# Patient Record
Sex: MALE | Race: WHITE | NOT HISPANIC OR LATINO
[De-identification: names, ages, dates, MRNs, and addresses within clinical notes are randomized per-mention and may not be internally consistent; named-entity substitution may affect disease eponyms.]

---

## 2023-06-09 ENCOUNTER — NON-APPOINTMENT (OUTPATIENT)
Age: 53
End: 2023-06-09

## 2023-06-09 ENCOUNTER — APPOINTMENT (OUTPATIENT)
Dept: FAMILY MEDICINE | Facility: CLINIC | Age: 53
End: 2023-06-09
Payer: MEDICAID

## 2023-06-09 VITALS
OXYGEN SATURATION: 98 % | DIASTOLIC BLOOD PRESSURE: 77 MMHG | HEIGHT: 67 IN | WEIGHT: 161 LBS | SYSTOLIC BLOOD PRESSURE: 125 MMHG | HEART RATE: 64 BPM | BODY MASS INDEX: 25.27 KG/M2 | TEMPERATURE: 97.6 F

## 2023-06-09 DIAGNOSIS — Z00.00 ENCOUNTER FOR GENERAL ADULT MEDICAL EXAMINATION W/OUT ABNORMAL FINDINGS: ICD-10-CM

## 2023-06-09 DIAGNOSIS — Z78.9 OTHER SPECIFIED HEALTH STATUS: ICD-10-CM

## 2023-06-09 PROCEDURE — 93000 ELECTROCARDIOGRAM COMPLETE: CPT

## 2023-06-09 PROCEDURE — 99386 PREV VISIT NEW AGE 40-64: CPT | Mod: 25

## 2023-06-09 NOTE — PHYSICAL EXAM
[Normal] : no acute distress, well nourished, well developed and well-appearing [PERRL] : pupils equal round and reactive to light [Thyroid Normal, No Nodules] : the thyroid was normal and there were no nodules present [No Respiratory Distress] : no respiratory distress  [No Accessory Muscle Use] : no accessory muscle use [Clear to Auscultation] : lungs were clear to auscultation bilaterally [No Edema] : there was no peripheral edema [Soft] : abdomen soft [Non Tender] : non-tender [Normal Bowel Sounds] : normal bowel sounds [No Rash] : no rash [Coordination Grossly Intact] : coordination grossly intact [Normal Insight/Judgement] : insight and judgment were intact [de-identified] : sinus poppy

## 2023-06-09 NOTE — HISTORY OF PRESENT ILLNESS
[FreeTextEntry1] : no complaints, new patient visit [de-identified] : 53 yo M no significant PMHx, has not been to any provider since  here today to establish care as new patient.\par No PMHx\par PSHx: ACL repair\par Used to be 115 lb heavier but lost weight years ago since he started crossfit\par \par Family Hx:\par Mom: HTN\par Maternal grandmother: DM 2\par Maternal uncle  from prostate CA, 70's\par Various cancers on both sides, not sure what specifically

## 2023-06-09 NOTE — HEALTH RISK ASSESSMENT
[Good] : ~his/her~  mood as  good [Yes] : Yes [4 or more  times a week (4 pts)] : 4 or more  times a week (4 points) [3 or 4 (1 pt)] : 3 or 4  (1 point) [Monthly (2 pts)] : Monthly (2 points) [No falls in past year] : Patient reported no falls in the past year [0] : 2) Feeling down, depressed, or hopeless: Not at all (0) [PHQ-2 Negative - No further assessment needed] : PHQ-2 Negative - No further assessment needed [None] : None [With Significant Other] : lives with significant other [Employed] : employed [High School] : high school [Single] : single [Sexually Active] : sexually active [Feels Safe at Home] : Feels safe at home [Fully functional (bathing, dressing, toileting, transferring, walking, feeding)] : Fully functional (bathing, dressing, toileting, transferring, walking, feeding) [Fully functional (using the telephone, shopping, preparing meals, housekeeping, doing laundry, using] : Fully functional and needs no help or supervision to perform IADLs (using the telephone, shopping, preparing meals, housekeeping, doing laundry, using transportation, managing medications and managing finances) [Smoke Detector] : smoke detector [Carbon Monoxide Detector] : carbon monoxide detector [Seat Belt] :  uses seat belt [Patient/Caregiver not ready to engage] : , patient/caregiver not ready to engage [Never] : Never [Audit-CScore] : 7 [de-identified] : crossfit  [[id number]] : 0 [Change in mental status noted] : No change in mental status noted [Language] : denies difficulty with language [Behavior] : denies difficulty with behavior [Learning/Retaining New Information] : denies difficulty learning/retaining new information [Handling Complex Tasks] : denies difficulty handling complex tasks [Reasoning] : denies difficulty with reasoning [Spatial Ability and Orientation] : denies difficulty with spatial ability and orientation [Reports changes in hearing] : Reports no changes in hearing [Reports changes in vision] : Reports no changes in vision [Reports changes in dental health] : Reports no changes in dental health [Sunscreen] : does not use sunscreen [ColonoscopyComments] : never; will give a referral  [FreeTextEntry2] : \par   [de-identified] : uses glasses for distance and reading [de-identified] : will see dentist

## 2023-06-10 LAB
APPEARANCE: CLEAR
BILIRUBIN URINE: NEGATIVE
BLOOD URINE: NEGATIVE
COLOR: YELLOW
GLUCOSE QUALITATIVE U: NEGATIVE MG/DL
KETONES URINE: NEGATIVE MG/DL
LEUKOCYTE ESTERASE URINE: NEGATIVE
NITRITE URINE: NEGATIVE
PH URINE: 6
PROTEIN URINE: NEGATIVE MG/DL
PSA SERPL-MCNC: 0.62 NG/ML
SPECIFIC GRAVITY URINE: 1.01
UROBILINOGEN URINE: 0.2 MG/DL

## 2023-06-11 ENCOUNTER — NON-APPOINTMENT (OUTPATIENT)
Age: 53
End: 2023-06-11

## 2023-06-12 LAB
25(OH)D3 SERPL-MCNC: 54.1 NG/ML
ALBUMIN SERPL ELPH-MCNC: 5 G/DL
ALP BLD-CCNC: 53 U/L
ALT SERPL-CCNC: 20 U/L
ANION GAP SERPL CALC-SCNC: 12 MMOL/L
AST SERPL-CCNC: 21 U/L
BILIRUB SERPL-MCNC: 1.1 MG/DL
BUN SERPL-MCNC: 9 MG/DL
CALCIUM SERPL-MCNC: 10.7 MG/DL
CHLORIDE SERPL-SCNC: 103 MMOL/L
CHOLEST SERPL-MCNC: 177 MG/DL
CO2 SERPL-SCNC: 28 MMOL/L
CREAT SERPL-MCNC: 0.9 MG/DL
EGFR: 103 ML/MIN/1.73M2
ESTIMATED AVERAGE GLUCOSE: 103 MG/DL
FOLATE SERPL-MCNC: 12.3 NG/ML
GLUCOSE SERPL-MCNC: 89 MG/DL
HBA1C MFR BLD HPLC: 5.2 %
HCV RNA FLD QL NAA+PROBE: NORMAL
HCV RNA SPEC QL PROBE+SIG AMP: NOT DETECTED
HDLC SERPL-MCNC: 85 MG/DL
HIV1+2 AB SPEC QL IA.RAPID: NONREACTIVE
LDLC SERPL CALC-MCNC: 82 MG/DL
MAGNESIUM SERPL-MCNC: 1.8 MG/DL
NONHDLC SERPL-MCNC: 92 MG/DL
POTASSIUM SERPL-SCNC: 4.2 MMOL/L
PROT SERPL-MCNC: 7.7 G/DL
SODIUM SERPL-SCNC: 144 MMOL/L
TRIGL SERPL-MCNC: 52 MG/DL
TSH SERPL-ACNC: 1.4 UIU/ML
VIT B12 SERPL-MCNC: 959 PG/ML

## 2023-06-27 ENCOUNTER — NON-APPOINTMENT (OUTPATIENT)
Age: 53
End: 2023-06-27

## 2023-07-26 ENCOUNTER — APPOINTMENT (OUTPATIENT)
Dept: INTERNAL MEDICINE | Facility: CLINIC | Age: 53
End: 2023-07-26
Payer: MEDICAID

## 2023-07-26 VITALS
HEART RATE: 53 BPM | OXYGEN SATURATION: 98 % | BODY MASS INDEX: 25.43 KG/M2 | HEIGHT: 67 IN | DIASTOLIC BLOOD PRESSURE: 70 MMHG | WEIGHT: 162 LBS | SYSTOLIC BLOOD PRESSURE: 124 MMHG

## 2023-07-26 DIAGNOSIS — Z12.11 ENCOUNTER FOR SCREENING FOR MALIGNANT NEOPLASM OF COLON: ICD-10-CM

## 2023-07-26 PROCEDURE — 99203 OFFICE O/P NEW LOW 30 MIN: CPT

## 2023-08-16 ENCOUNTER — APPOINTMENT (OUTPATIENT)
Dept: CARDIOLOGY | Facility: CLINIC | Age: 53
End: 2023-08-16
Payer: MEDICAID

## 2023-08-16 ENCOUNTER — NON-APPOINTMENT (OUTPATIENT)
Age: 53
End: 2023-08-16

## 2023-08-16 VITALS — OXYGEN SATURATION: 100 % | SYSTOLIC BLOOD PRESSURE: 135 MMHG | DIASTOLIC BLOOD PRESSURE: 84 MMHG | HEART RATE: 56 BPM

## 2023-08-16 DIAGNOSIS — Z78.9 OTHER SPECIFIED HEALTH STATUS: ICD-10-CM

## 2023-08-16 PROCEDURE — 99203 OFFICE O/P NEW LOW 30 MIN: CPT | Mod: 25

## 2023-08-16 PROCEDURE — 93000 ELECTROCARDIOGRAM COMPLETE: CPT

## 2023-10-01 PROBLEM — Z78.9 CAFFEINE USE: Status: ACTIVE | Noted: 2023-08-16

## 2023-11-03 ENCOUNTER — APPOINTMENT (OUTPATIENT)
Dept: INTERNAL MEDICINE | Facility: AMBULATORY MEDICAL SERVICES | Age: 53
End: 2023-11-03
Payer: MEDICAID

## 2023-11-03 PROCEDURE — 45382 COLONOSCOPY W/CONTROL BLEED: CPT | Mod: PT

## 2023-11-22 ENCOUNTER — APPOINTMENT (OUTPATIENT)
Dept: SURGERY | Facility: CLINIC | Age: 53
End: 2023-11-22
Payer: MEDICAID

## 2023-11-22 VITALS
OXYGEN SATURATION: 98 % | BODY MASS INDEX: 25.11 KG/M2 | WEIGHT: 160 LBS | SYSTOLIC BLOOD PRESSURE: 173 MMHG | RESPIRATION RATE: 17 BRPM | HEART RATE: 61 BPM | TEMPERATURE: 97.3 F | HEIGHT: 67 IN | DIASTOLIC BLOOD PRESSURE: 100 MMHG

## 2023-11-22 DIAGNOSIS — Z78.9 OTHER SPECIFIED HEALTH STATUS: ICD-10-CM

## 2023-11-22 PROCEDURE — 45330 DIAGNOSTIC SIGMOIDOSCOPY: CPT

## 2023-11-22 PROCEDURE — 99204 OFFICE O/P NEW MOD 45 MIN: CPT | Mod: 25

## 2023-11-22 RX ORDER — SODIUM SULFATE, POTASSIUM SULFATE AND MAGNESIUM SULFATE 1.6; 3.13; 17.5 G/177ML; G/177ML; G/177ML
17.5-3.13-1.6 SOLUTION ORAL
Qty: 2 | Refills: 0 | Status: DISCONTINUED | COMMUNITY
Start: 2023-10-31 | End: 2023-11-22

## 2023-11-24 ENCOUNTER — RESULT REVIEW (OUTPATIENT)
Age: 53
End: 2023-11-24

## 2023-11-27 ENCOUNTER — RESULT REVIEW (OUTPATIENT)
Age: 53
End: 2023-11-27

## 2023-11-27 LAB
ALBUMIN SERPL ELPH-MCNC: 5.1 G/DL
ALP BLD-CCNC: 68 U/L
ALT SERPL-CCNC: 16 U/L
ANION GAP SERPL CALC-SCNC: 10 MMOL/L
AST SERPL-CCNC: 23 U/L
BASOPHILS # BLD AUTO: 0.07 K/UL
BASOPHILS NFR BLD AUTO: 1.2 %
BILIRUB SERPL-MCNC: 1.4 MG/DL
BUN SERPL-MCNC: 11 MG/DL
CALCIUM SERPL-MCNC: 10.1 MG/DL
CEA SERPL-MCNC: 2 NG/ML
CHLORIDE SERPL-SCNC: 103 MMOL/L
CO2 SERPL-SCNC: 29 MMOL/L
CREAT SERPL-MCNC: 0.86 MG/DL
EGFR: 104 ML/MIN/1.73M2
EOSINOPHIL # BLD AUTO: 0.13 K/UL
EOSINOPHIL NFR BLD AUTO: 2.1 %
GLUCOSE SERPL-MCNC: 90 MG/DL
HCT VFR BLD CALC: 44.2 %
HGB BLD-MCNC: 14.6 G/DL
IMM GRANULOCYTES NFR BLD AUTO: 0.5 %
LYMPHOCYTES # BLD AUTO: 2.11 K/UL
LYMPHOCYTES NFR BLD AUTO: 34.7 %
MAN DIFF?: NORMAL
MCHC RBC-ENTMCNC: 31.9 PG
MCHC RBC-ENTMCNC: 33 GM/DL
MCV RBC AUTO: 96.7 FL
MONOCYTES # BLD AUTO: 0.62 K/UL
MONOCYTES NFR BLD AUTO: 10.2 %
NEUTROPHILS # BLD AUTO: 3.12 K/UL
NEUTROPHILS NFR BLD AUTO: 51.3 %
PLATELET # BLD AUTO: 226 K/UL
POTASSIUM SERPL-SCNC: 4.9 MMOL/L
PROT SERPL-MCNC: 7.8 G/DL
RBC # BLD: 4.57 M/UL
RBC # FLD: 13.6 %
SODIUM SERPL-SCNC: 142 MMOL/L
WBC # FLD AUTO: 6.08 K/UL

## 2023-11-28 ENCOUNTER — APPOINTMENT (OUTPATIENT)
Dept: MRI IMAGING | Facility: IMAGING CENTER | Age: 53
End: 2023-11-28
Payer: MEDICAID

## 2023-11-28 ENCOUNTER — APPOINTMENT (OUTPATIENT)
Dept: RADIOLOGY | Facility: IMAGING CENTER | Age: 53
End: 2023-11-28

## 2023-11-28 ENCOUNTER — APPOINTMENT (OUTPATIENT)
Dept: CT IMAGING | Facility: IMAGING CENTER | Age: 53
End: 2023-11-28

## 2023-11-28 ENCOUNTER — OUTPATIENT (OUTPATIENT)
Dept: OUTPATIENT SERVICES | Facility: HOSPITAL | Age: 53
LOS: 1 days | End: 2023-11-28
Payer: MEDICAID

## 2023-11-28 ENCOUNTER — RESULT REVIEW (OUTPATIENT)
Age: 53
End: 2023-11-28

## 2023-11-28 ENCOUNTER — APPOINTMENT (OUTPATIENT)
Dept: CV DIAGNOSITCS | Facility: HOSPITAL | Age: 53
End: 2023-11-28

## 2023-11-28 ENCOUNTER — OUTPATIENT (OUTPATIENT)
Dept: OUTPATIENT SERVICES | Facility: HOSPITAL | Age: 53
LOS: 1 days | End: 2023-11-28
Payer: SELF-PAY

## 2023-11-28 DIAGNOSIS — C20 MALIGNANT NEOPLASM OF RECTUM: ICD-10-CM

## 2023-11-28 DIAGNOSIS — R94.31 ABNORMAL ELECTROCARDIOGRAM [ECG] [EKG]: ICD-10-CM

## 2023-11-28 PROCEDURE — 93306 TTE W/DOPPLER COMPLETE: CPT

## 2023-11-28 PROCEDURE — 76376 3D RENDER W/INTRP POSTPROCES: CPT

## 2023-11-28 PROCEDURE — 74018 RADEX ABDOMEN 1 VIEW: CPT

## 2023-11-28 PROCEDURE — 76376 3D RENDER W/INTRP POSTPROCES: CPT | Mod: 26

## 2023-11-28 PROCEDURE — 93306 TTE W/DOPPLER COMPLETE: CPT | Mod: 26

## 2023-11-28 PROCEDURE — 93356 MYOCRD STRAIN IMG SPCKL TRCK: CPT

## 2023-11-28 PROCEDURE — 74177 CT ABD & PELVIS W/CONTRAST: CPT | Mod: 26

## 2023-11-28 PROCEDURE — 71260 CT THORAX DX C+: CPT

## 2023-11-28 PROCEDURE — 74177 CT ABD & PELVIS W/CONTRAST: CPT

## 2023-11-28 PROCEDURE — 72197 MRI PELVIS W/O & W/DYE: CPT | Mod: 26

## 2023-11-28 PROCEDURE — A9585: CPT

## 2023-11-28 PROCEDURE — 71260 CT THORAX DX C+: CPT | Mod: 26

## 2023-11-28 PROCEDURE — 72197 MRI PELVIS W/O & W/DYE: CPT

## 2023-11-28 PROCEDURE — 74018 RADEX ABDOMEN 1 VIEW: CPT | Mod: 26

## 2023-12-11 ENCOUNTER — NON-APPOINTMENT (OUTPATIENT)
Age: 53
End: 2023-12-11

## 2023-12-21 ENCOUNTER — APPOINTMENT (OUTPATIENT)
Dept: SURGERY | Facility: CLINIC | Age: 53
End: 2023-12-21
Payer: MEDICAID

## 2023-12-21 VITALS
SYSTOLIC BLOOD PRESSURE: 156 MMHG | OXYGEN SATURATION: 98 % | HEART RATE: 62 BPM | BODY MASS INDEX: 25.9 KG/M2 | DIASTOLIC BLOOD PRESSURE: 85 MMHG | RESPIRATION RATE: 18 BRPM | TEMPERATURE: 96 F | HEIGHT: 67 IN | WEIGHT: 165 LBS

## 2023-12-21 DIAGNOSIS — C20 MALIGNANT NEOPLASM OF RECTUM: ICD-10-CM

## 2023-12-21 PROCEDURE — 99213 OFFICE O/P EST LOW 20 MIN: CPT

## 2023-12-21 NOTE — ASSESSMENT
[FreeTextEntry1] : I reviewed the issues at hand exactly as discussed with the patient on the telephone and as documented in the telephone note.  I discussed the risk of enlarging metastatic cancer in the lymph nodes that are not identifiable until the tumor is no longer microscopic.  I discussed the indications, risks, benefits, alternatives of a laparoscopic anterior resection with partial TME and [place] distal rectal anastomosis including but not limited to bleeding, infection, anastomotic leak, low anterior resection syndrome, and negative pathology.  The patient's daughter was present for the conversation and all questions were answered.  The case will be represented at [facility] tumor board to try to get a consensus recommendation.

## 2023-12-21 NOTE — HISTORY OF PRESENT ILLNESS
[FreeTextEntry1] : Polo is a 52 y/o male here for a follow up visit, discuss sx.   Colonoscopy on 11/3/23 - Polyp (10mm) in the hepatic flexure. (Polypectomy). Polyp (20mm) in the rectosigmoid junction. (Polypectomy, Endoclip). Normal mucosa in the cecum. Grade/Stage 1 internal hemorrhoids. Pathology - A. Hepatic flexure polyp: tubular adenoma. B. Polyp, rectosigmoid: adenocarcinoma, moderately differentiated invasive into superficial submucosa. Suspicious for small vessel invasion. Cauterized deep margin negative for malignancy (0.2 cm from the carcinoma). The adenocarcinoma focus measures 0.4cm by microscopic measurement. background tubular adenoma with erosion and focal villous features. Mismatch repair immunohistochemistry to be reported as addendum.  Last spoken to on 12/11/23 - Pathology special stains discussed at length with Dr. Quintanilla. Definitive vessel invasion could not be identified. The details of the findings are in the updated pathology report. I called the patient on the telephone and reviewed all of this information with him. As per colorectal literature and [facility] recommendations surgery should be performed if lymphovascular invasion is identified because of the risk of lymph node positivity at this time of the polypectomy or in the future. If no lymphovascular invasion is identified and there are no other poor prognostic indicators, then no surgery should be performed. The special stains in this patient did not provide a definitive answer. Tumor cells were partially encircled by endovascular cells. This may be because of tangential cutting of the specimen or because there is no lymphovascular invasion. I discussed this point with the patient. Since an absolute recommendation for or against surgery cannot be made, I reviewed the risks and benefits of both close observation and surgery. This discussion included but was not limited to the low risk of positive lymph nodes resulting in recurrent and incurable rectal cancer if no surgery is performed, and the low risk of significant surgical complications such as anastomotic leak requiring emergent surgical intervention and a temporary ostomy if surgery is performed. If the patient chooses continued observation, then follow-up will be based upon the [facility] watch and wait protocol for patients who have received MICHELLE for rectal cancer. This includes flexible sigmoidoscopy every 3 months for 2 years and pelvic MRI every 6 months for 2 years. Follow-up after that is flexible sigmoidoscopy every 6 months and MRI yearly until the 5-year lisandro is reached. I suggested that the patient review this information with his family and that he returns to my office for further discussion with his family if need be. Once he makes a decision regarding surgery, he will notify my office.  Today pt reports no pain. Daily BMs, formed, denies pain, no bleeding, no episodes of incontinence, and denies feeling swollen or prolapsed tissue.  Good appetite.  No fever or chills.  Denies nausea or vomiting.   Not taking any anticoagulants.

## 2024-01-11 ENCOUNTER — NON-APPOINTMENT (OUTPATIENT)
Age: 54
End: 2024-01-11

## 2024-02-28 ENCOUNTER — APPOINTMENT (OUTPATIENT)
Dept: CARDIOLOGY | Facility: CLINIC | Age: 54
End: 2024-02-28
Payer: COMMERCIAL

## 2024-02-28 VITALS
HEART RATE: 55 BPM | HEIGHT: 67 IN | OXYGEN SATURATION: 98 % | SYSTOLIC BLOOD PRESSURE: 151 MMHG | DIASTOLIC BLOOD PRESSURE: 88 MMHG | BODY MASS INDEX: 26.68 KG/M2 | WEIGHT: 170 LBS

## 2024-02-28 DIAGNOSIS — R94.31 ABNORMAL ELECTROCARDIOGRAM [ECG] [EKG]: ICD-10-CM

## 2024-02-28 PROCEDURE — 99213 OFFICE O/P EST LOW 20 MIN: CPT | Mod: 25

## 2024-02-28 PROCEDURE — 93000 ELECTROCARDIOGRAM COMPLETE: CPT

## 2024-04-13 PROBLEM — R94.31 ABNORMAL EKG: Status: ACTIVE | Noted: 2023-08-16

## 2024-04-13 NOTE — DISCUSSION/SUMMARY
[FreeTextEntry1] : EKG- voltage for LVH with BP measurements controlled  No indication for further cardiac workup  Followup in 1 year [EKG obtained to assist in diagnosis and management of assessed problem(s)] : EKG obtained to assist in diagnosis and management of assessed problem(s)

## 2024-04-13 NOTE — HISTORY OF PRESENT ILLNESS
[FreeTextEntry1] : 53 year old male without past cardiac history or risk factors sent to cards for abnormal EKG.  No CP, SOB or h/A

## 2024-04-23 ENCOUNTER — APPOINTMENT (OUTPATIENT)
Dept: MRI IMAGING | Facility: CLINIC | Age: 54
End: 2024-04-23
Payer: COMMERCIAL

## 2024-04-23 ENCOUNTER — OUTPATIENT (OUTPATIENT)
Dept: OUTPATIENT SERVICES | Facility: HOSPITAL | Age: 54
LOS: 1 days | End: 2024-04-23
Payer: COMMERCIAL

## 2024-04-23 DIAGNOSIS — Z00.8 ENCOUNTER FOR OTHER GENERAL EXAMINATION: ICD-10-CM

## 2024-04-23 PROCEDURE — A9585: CPT

## 2024-04-23 PROCEDURE — 72197 MRI PELVIS W/O & W/DYE: CPT | Mod: 26

## 2024-04-23 PROCEDURE — 72197 MRI PELVIS W/O & W/DYE: CPT

## 2024-05-29 ENCOUNTER — APPOINTMENT (OUTPATIENT)
Dept: SURGERY | Facility: CLINIC | Age: 54
End: 2024-05-29

## 2024-06-13 NOTE — HISTORY OF PRESENT ILLNESS
[FreeTextEntry1] : Polo is a 52 y/o male here for a follow up visit, flex sigmoidsocospy  s/p flex sig 11/22/23   Colonoscopy on 11/3/23 - Polyp (10mm) in the hepatic flexure. (Polypectomy). Polyp (20mm) in the rectosigmoid junction. (Polypectomy, Endoclip). Normal mucosa in the cecum. Grade/Stage 1 internal hemorrhoids. Pathology - A. Hepatic flexure polyp: tubular adenoma. B. Polyp, rectosigmoid: adenocarcinoma, moderately differentiated invasive into superficial submucosa. Suspicious for small vessel invasion. Cauterized deep margin negative for malignancy (0.2 cm from the carcinoma). The adenocarcinoma focus measures 0.4cm by microscopic measurement. background tubular adenoma with erosion and focal villous features. Mismatch repair immunohistochemistry to be reported as addendum.  Last spoken to on 01/11/24 - Case reviewed at rectal cancer tumor board and consensus obtained that no clear absolute indication for surgery. Therefore after presentation of the risks and benefits of continued observation and surgery as was already performed it would be up to the patient to make a decision regarding his choice. I just called the patient on the telephone and he confirmed his choice for observation. Will make arrangements for a flexible sigmoidoscopy and repeat pelvic MRI 6 months after previous MRI.

## 2024-08-07 ENCOUNTER — APPOINTMENT (OUTPATIENT)
Dept: SURGERY | Facility: CLINIC | Age: 54
End: 2024-08-07

## 2024-08-07 PROCEDURE — 45330 DIAGNOSTIC SIGMOIDOSCOPY: CPT

## 2024-08-07 NOTE — DATA REVIEWED
[FreeTextEntry1] : I Caron Rabago RN, attest that I was present throughout the exam.  I Caron Rabago RN, attest that I was present throughout the exam.

## 2024-08-07 NOTE — PHYSICAL EXAM
FreeTextEntry1] : Perianal inspection and digital exam unremarkable.  Flexible sigmoidoscopy revealed a normal-appearing polypectomy site.  Sigmoidoscopy performed to evaluate rectum and polypectomy site.  No sedation required.

## 2024-08-07 NOTE — ASSESSMENT
[FreeTextEntry1] : No evidence of local recurrence.  Repeat colonoscopy in October or November.  MRI at that same time.  Repeat sigmoidoscopy in March or April.

## 2024-08-07 NOTE — HISTORY OF PRESENT ILLNESS
[FreeTextEntry1] : Polo is a 54 y/o male here for a follow up visit, flexible sigmoidoscopy  Colonoscopy on 11/3/23 - Polyp (10mm) in the hepatic flexure. (Polypectomy). Polyp (20mm) in the rectosigmoid junction. (Polypectomy, Endoclip). Normal mucosa in the cecum. Grade/Stage 1 internal hemorrhoids. Pathology - A. Hepatic flexure polyp: tubular adenoma. B. Polyp, rectosigmoid: adenocarcinoma, moderately differentiated invasive into superficial submucosa. Suspicious for small vessel invasion. Cauterized deep margin negative for malignancy (0.2 cm from the carcinoma). The adenocarcinoma focus measures 0.4cm by microscopic measurement. background tubular adenoma with erosion and focal villous features. Mismatch repair immunohistochemistry to be reported as addendum.  MR Pelvis, Rectal, Anal 4/23/24 (Rectal cancer follow-up. Surveillance imaging post polypectomy) - Stable exam since the prior examination 11/28/2023. No MR evidence of recurrent disease status post polypectomy.  Suspicious Mesorectal Lymph Nodes: No Suspicious Extra Mesorectal Lymph Nodes: No  Last spoken to on 1/11/24 - Case reviewed at rectal cancer tumor board and consensus obtained that no clear absolute indication for surgery. Therefore after presentation of the risks and benefits of continued observation and surgery as was already performed it would be up to the patient to make a decision regarding his choice. I just called the patient on the telephone and he confirmed his choice for observation. Will make arrangements for a flexible sigmoidoscopy and repeat pelvic MRI 6 months after previous MRI.  Today pt reports feeling well.  Regular 2-3 times BMs daily no constipation or diarrhea, no straining, or bleeding.  Denies feeling prolapsing tissue or swelling.   No episodes of incontinence of stool or flatus.  Good appetite.  No c/o nausea/vomiting.  Denies fever and chills.

## 2024-08-07 NOTE — PHYSICAL EXAM
[FreeTextEntry1] : Perianal inspection and digital exam unremarkable.  Flexible sigmoidoscopy revealed a normal-appearing polypectomy site.  Sigmoidoscopy performed to evaluate rectum and polypectomy site.  No sedation required.

## 2024-08-07 NOTE — HISTORY OF PRESENT ILLNESS
[FreeTextEntry1] : Polo is a 52 y/o male here for a follow up visit, flexible sigmoidoscopy  Colonoscopy on 11/3/23 - Polyp (10mm) in the hepatic flexure. (Polypectomy). Polyp (20mm) in the rectosigmoid junction. (Polypectomy, Endoclip). Normal mucosa in the cecum. Grade/Stage 1 internal hemorrhoids. Pathology - A. Hepatic flexure polyp: tubular adenoma. B. Polyp, rectosigmoid: adenocarcinoma, moderately differentiated invasive into superficial submucosa. Suspicious for small vessel invasion. Cauterized deep margin negative for malignancy (0.2 cm from the carcinoma). The adenocarcinoma focus measures 0.4cm by microscopic measurement. background tubular adenoma with erosion and focal villous features. Mismatch repair immunohistochemistry to be reported as addendum.  MR Pelvis, Rectal, Anal 4/23/24 (Rectal cancer follow-up. Surveillance imaging post polypectomy) - Stable exam since the prior examination 11/28/2023. No MR evidence of recurrent disease status post polypectomy.  Suspicious Mesorectal Lymph Nodes: No Suspicious Extra Mesorectal Lymph Nodes: No  Last spoken to on 1/11/24 - Case reviewed at rectal cancer tumor board and consensus obtained that no clear absolute indication for surgery. Therefore after presentation of the risks and benefits of continued observation and surgery as was already performed it would be up to the patient to make a decision regarding his choice. I just called the patient on the telephone and he confirmed his choice for observation. Will make arrangements for a flexible sigmoidoscopy and repeat pelvic MRI 6 months after previous MRI.  Today pt reports feeling well.  Regular 2-3 times BMs daily no constipation or diarrhea, no straining, or bleeding.  Denies feeling prolapsing tissue or swelling.   No episodes of incontinence of stool or flatus.  Good appetite.  No c/o nausea/vomiting.  Denies fever and chills.

## 2024-08-27 DIAGNOSIS — C20 MALIGNANT NEOPLASM OF RECTUM: ICD-10-CM

## 2024-08-27 RX ORDER — SODIUM SULFATE, POTASSIUM SULFATE AND MAGNESIUM SULFATE 1.6; 3.13; 17.5 G/177ML; G/177ML; G/177ML
17.5-3.13-1.6 SOLUTION ORAL
Qty: 2 | Refills: 0 | Status: ACTIVE | COMMUNITY
Start: 2024-08-27 | End: 1900-01-01

## 2024-09-18 ENCOUNTER — APPOINTMENT (OUTPATIENT)
Dept: CARDIOLOGY | Facility: CLINIC | Age: 54
End: 2024-09-18

## 2024-10-04 ENCOUNTER — APPOINTMENT (OUTPATIENT)
Dept: MRI IMAGING | Facility: CLINIC | Age: 54
End: 2024-10-04

## 2024-10-04 ENCOUNTER — OUTPATIENT (OUTPATIENT)
Dept: OUTPATIENT SERVICES | Facility: HOSPITAL | Age: 54
LOS: 1 days | End: 2024-10-04
Payer: COMMERCIAL

## 2024-10-04 DIAGNOSIS — C20 MALIGNANT NEOPLASM OF RECTUM: ICD-10-CM

## 2024-10-04 PROCEDURE — A9585: CPT

## 2024-10-04 PROCEDURE — 72197 MRI PELVIS W/O & W/DYE: CPT | Mod: 26

## 2024-10-04 PROCEDURE — 72197 MRI PELVIS W/O & W/DYE: CPT

## 2024-10-30 ENCOUNTER — NON-APPOINTMENT (OUTPATIENT)
Age: 54
End: 2024-10-30

## 2024-10-30 ENCOUNTER — APPOINTMENT (OUTPATIENT)
Dept: CARDIOLOGY | Facility: CLINIC | Age: 54
End: 2024-10-30

## 2024-10-30 VITALS
WEIGHT: 175 LBS | HEART RATE: 51 BPM | OXYGEN SATURATION: 99 % | HEIGHT: 67 IN | DIASTOLIC BLOOD PRESSURE: 73 MMHG | BODY MASS INDEX: 27.47 KG/M2 | SYSTOLIC BLOOD PRESSURE: 115 MMHG

## 2024-10-30 PROCEDURE — 99213 OFFICE O/P EST LOW 20 MIN: CPT | Mod: 25

## 2024-10-30 PROCEDURE — 93000 ELECTROCARDIOGRAM COMPLETE: CPT

## 2024-12-27 ENCOUNTER — APPOINTMENT (OUTPATIENT)
Dept: INTERNAL MEDICINE | Facility: AMBULATORY MEDICAL SERVICES | Age: 54
End: 2024-12-27
Payer: COMMERCIAL

## 2024-12-27 PROCEDURE — 45380 COLONOSCOPY AND BIOPSY: CPT

## 2025-03-12 ENCOUNTER — APPOINTMENT (OUTPATIENT)
Dept: SURGERY | Facility: CLINIC | Age: 55
End: 2025-03-12
Payer: COMMERCIAL

## 2025-03-12 VITALS
TEMPERATURE: 97.3 F | RESPIRATION RATE: 17 BRPM | OXYGEN SATURATION: 97 % | HEART RATE: 56 BPM | SYSTOLIC BLOOD PRESSURE: 150 MMHG | DIASTOLIC BLOOD PRESSURE: 95 MMHG

## 2025-03-12 DIAGNOSIS — C20 MALIGNANT NEOPLASM OF RECTUM: ICD-10-CM

## 2025-03-12 PROCEDURE — 45330 DIAGNOSTIC SIGMOIDOSCOPY: CPT

## 2025-03-19 NOTE — REVIEW OF SYSTEMS
[Negative] : Heme/Lymph Vital Signs Last 24 Hrs  T(C): 36.8 (03-19-25 @ 08:13), Max: 36.8 (03-19-25 @ 08:13)  T(F): 98.2 (03-19-25 @ 08:13), Max: 98.2 (03-19-25 @ 08:13)  HR: --  BP: --  BP(mean): --  RR: --  SpO2: --    Orthostatic VS  03-19-25 @ 08:13  Lying BP: --/-- HR: --  Sitting BP: 100/66 HR: 70  Standing BP: 93/61 HR: 92  Site: --  Mode: --  Orthostatic VS  03-18-25 @ 08:44  Lying BP: --/-- HR: --  Sitting BP: 105/71 HR: 86  Standing BP: 102/71 HR: 100  Site: --  Mode: --